# Patient Record
Sex: FEMALE | Race: WHITE | URBAN - METROPOLITAN AREA
[De-identification: names, ages, dates, MRNs, and addresses within clinical notes are randomized per-mention and may not be internally consistent; named-entity substitution may affect disease eponyms.]

---

## 2017-10-12 ENCOUNTER — HOSPITAL ENCOUNTER (OUTPATIENT)
Dept: PHYSICAL THERAPY | Age: 72
Discharge: HOME | End: 2017-10-31

## 2017-10-12 NOTE — FLOWSHEET NOTE
Physical Therapy Daily Treatment Note    Date:  10/12/2017    Patient Name:  Marlee Decker    :  1945  MRN: [id number]  Restrictions/Precautions:    Medical/Treatment Diagnosis Information:  · Diagnosis: Vascular insufficiency  Insurance/Certification information:  PT Insurance Information: Medicare [employer]  Physician Information:  Referring Practitioner: Hilma Krabbe  Plan of care signed (Y/N): Faxed on day of eval  Visit# / total visits:      G-Code (if applicable):         PT G-Codes  Functional Assessment Tool Used: LEFS  Score: Raw Score 19  Functional Limitation: Mobility: Walking and moving around  Mobility: Walking and Moving Around Current Status (): At least 60 percent but less than 80 percent impaired, limited or restricted  Mobility: Walking and Moving Around Goal Status (): At least 40 percent but less than 60 percent impaired, limited or restricted    Medicare Cap (if applicable):  $ 342.88 = total amount used, updated 10/12/2017    Time in:   1:30      Timed Treatment: 30 min Total Treatment Time: 90 minutes  ________________________________________________________________________________________    Pain Level:   0 /10 LLE, pt does report chronic LBP  SUBJECTIVE:      OBJECTIVE:     Exercise/Equipment Resistance/Repetitions Other comments            Start next visit                                                                                                                                 Other Therapeutic Activities:  Pt educated in lymphedema prevention/precautions, use of compression , types of compression, benefits of ex to promote lymph flow, use of jobst pump. Discussed goals of treatment and plan of care.  Pt voices understanding of all instructions and agreement with plan and goals    Manual Treatments: MLD to LLE x 25 minutes        Modalities:  Jobst pump x 15 minutes to LLE at 50mmHG    Test/Measurements:    LLE GIRTH*: 4\"AK 65 cm, Knee 49 cm,  4\"BK 45.5cm,

## 2017-10-12 NOTE — PROGRESS NOTES
Physical Therapy  Initial Assessment  Date: 10/12/2017  Patient Name: Leora Tran  MRN: [id number]  : 1945     Treatment Diagnosis: lymphedema LLE    Restrictions   none per script    Subjective   Chart Reviewed: Yes  Patient assessed for rehabilitation services?: Yes  Additional Pertinent Hx: DDD lumbar spine, OA, R THR-anterior, hypothyroid, CAD, CHRISTY, Obesity, HTN, DJD bilat knees. Pt denies CHF or CKD  Family / Caregiver Present: No  Referring Practitioner: Corine Palomo  Referral Date : 10/12/17  Diagnosis: Vascular insufficiency  Follows Commands: Within Functional Limits  PT Visit Information:   Onset Date: 17  PT Insurance Information: [employer]    Subjective: Pt reports was told she had lymhedema about a month ago. Tried compression hose from [employer] which were too tight. Now wearing circaids she ordered online which are knee high.   Pain Screening  Patient Currently in Pain: Denies (Denies LE pain, does have chronic LBP 4/10)    Vision/Hearing  Vision: Impaired (glasses)  Hearing: Within functional limits    Orientation  Overall Orientation Status: Within Normal Limits    Social/Functional History  Social/Functional History  Lives With: Spouse  Type of Home: House  Home Layout: One level  Home Access: Stairs to enter without rails (3)  ADL Assistance: Independent ( helps with compression garment and sometimes with shoes)  Ambulation Assistance: Independent  Transfer Assistance: Independent  Active : Yes  Occupation: Retired  Objective   Observation: Skin pink LLE compared to RLE, firm to touch in lower leg , skin dry at feet, unable to assess nail beds due to polish  LLE GIRTH*: 4\"AK 65cm, Knee 49cm, 4\"BK 45.5cm, 8\"BK 36cm, 12\"BK 25cm   TOTAL= 220.5cm  RLE GIRTH: 4\"AK 56cm, Knee 43cm, 4\"BK 41cm,  8\"BK 32cm, 12\"BK 23.5cm   TOTAL= 195.5cm    RLE AROM: WFL  LLE AROM : WFL    Strength RLE: WFL  Strength LLE: WFL    Assessment   Conditions Requiring Skilled Therapeutic Intervention  Assessment: Pt referred to OP PT for complex decongestive therapy LLE which is diagnosed with venous insufficiency. Pt has 25cm difference in girth LLE compared to RLE. Skin is pink LLE, firm to touch in LE, dry at ankle and foot. Pt has a circ aide type compression garment she ordered online which is knee high. Treatment Diagnosis: lymphedema LLE  Prognosis: Good  Decision Making: Low Complexity  Patient Education: role of PT, safety  Barriers to Learning: none  REQUIRES PT FOLLOW UP: Yes  Activity Tolerance: Patient Tolerated treatment well         Plan   Times per week: 2x week for up to 4 weeks  Specific instructions for Next Treatment: MLD, jobst pump , ther ex, education, compression garment  Current Treatment Recommendations: Safety Education & Training, Manual Lymphatic Drainage (jobst pump)    G-Code  PT G-Codes  Functional Assessment Tool Used: LEFS  Score: Raw Score 19  Functional Limitation: Mobility: Walking and moving around  Mobility: Walking and Moving Around Current Status (): At least 60 percent but less than 80 percent impaired, limited or restricted  Mobility: Walking and Moving Around Goal Status ():  At least 40 percent but less than 60 percent impaired, limited or restricted    Goals  Short term goals  Time Frame for Short term goals: 4 weeks  Short term goal 1: Pt to be indep with skin hygeine, lymphedema prevention/precautions  Short term goal 2: pt to be indep with HEP  Short term goal 3: pt to voice understanding of self massage and use of compression garment  Short term goal 4: decrease Total Girth LLE by at least 12 cm  Patient Goals   Patient goals : \"decrease swelling and increased mobiltiy LLE\"       Therapy Time   Individual Concurrent Group Co-treatment   Time In 0130         Time Out 0300         Minutes 90         Timed Code Treatment Minutes: 56 Kandice Dove, [id number]

## 2017-10-17 ENCOUNTER — HOSPITAL ENCOUNTER (OUTPATIENT)
Dept: PHYSICAL THERAPY | Age: 72
Discharge: HOME | End: 2017-10-17

## 2017-10-17 NOTE — FLOWSHEET NOTE
Physical Therapy Daily Treatment Note    Date:  10/17/2017    Patient Name:  Adriano Davis    :  1945  MRN: [id number]  Restrictions/Precautions:    Medical/Treatment Diagnosis Information:  · Diagnosis: Vascular insufficiency  Insurance/Certification information:  PT Insurance Information: Medicare [employer]  Physician Information:  Referring Practitioner: Justine Becerra  Plan of care signed (Y/N):  YES  Visit# / total visits: 2/8     G-Code (if applicable):         PT G-Codes  Functional Assessment Tool Used: LEFS  Score: Raw Score 19  Functional Limitation: Mobility: Walking and moving around  Mobility: Walking and Moving Around Current Status (): At least 60 percent but less than 80 percent impaired, limited or restricted  Mobility: Walking and Moving Around Goal Status (): At least 40 percent but less than 60 percent impaired, limited or restricted    Medicare Cap (if applicable):  $ 186.58 = total amount used, updated 10/17/2017    Time in:   1:00      Timed Treatment: 45 min Total Treatment Time: 60 minutes  ________________________________________________________________________________________    Pain Level:   0 /10 LLE, pt does report chronic LBP  SUBJECTIVE:  \"My leg felt so much better after the last session.  I looked at compression pumps on line and I'm thinking of getting one for home\"    OBJECTIVE:     Exercise/Equipment Resistance/Repetitions Other comments            Ankle pumps 10 x B    Ankle circles 10 x B    Heel slides 10 x B    Quad sets 10 x B    Hip IR/ER 10 x B    Hooklying march 10 x B    Glute sets 10 x B                                                       Other Therapeutic Activities:  Manual Treatments: MLD to LLE x 25 minutes        Modalities:  Jobst pump x 20 minutes to LLE at 50mmHG    Test/Measurements:    LLE GIRTH*: 4\"AK( 65) 63 cm, Knee( 49) 46 cm,  4\"BK (45.5) 43.5cm,  8\"BK(36) 33 cm,  12\"BK( 25) 24cm      TOTAL =(220.5) 209.5 cm  RLE GIRTH: 4\"AK 56 cm, Knee 43 cm,   4\"BK 41  cm,  8\"BK 32 cm, 12\"BK 23.5cm  TOTAL= 195.5cm  Total difference in girth R to L 25 cm on eval, 14 cm today. ASSESSMENT:   2nd visit today with 11 cm improvement in LLE total girth. Pt has 14 cm difference in girth LLE compared to RLE. Skin is pink LLE, firm to touch in LE, dry at ankle and foot. Pt has a circ aide type compression garment she ordered online which is knee high.       Treatment/Activity Tolerance:   [x] Patient tolerated treatment well [] Patient limited by fatique  [] Patient limited by pain [] Patient limited by other medical complications  [] Other:     Goals:      Short term goals  Time Frame for Short term goals: 4 weeks  Short term goal 1: Pt to be indep with skin hygeine, lymphedema prevention/precautions  Short term goal 2: pt to be indep with HEP  Short term goal 3: pt to voice understanding of self massage and use of compression garment  Short term goal 4: decrease Total Girth LLE by at least 12 cm  Patient Goals   Patient goals : \"decrease swelling and increased mobiltiy LLE\"          Plan: [x] Continue per plan of care [] Alter current plan (see comments)   [] Plan of care initiated [] Hold pending MD visit [] Discharge      Plan for Next Session:  ROMY, hawk ex, jobst compression pump, education    Re-Certification Due Date:   11/12/2017 or 10th visit      Signature:  Joshua Corley, [employer]

## 2017-10-31 ENCOUNTER — HOSPITAL ENCOUNTER (OUTPATIENT)
Dept: PHYSICAL THERAPY | Age: 72
Discharge: HOME | End: 2017-10-31

## 2017-11-01 ENCOUNTER — HOSPITAL ENCOUNTER (OUTPATIENT)
Dept: OTHER | Age: 72
Discharge: HOME | End: 2017-11-30

## 2017-11-02 NOTE — PROGRESS NOTES
Outpatient Physical Therapy  Phone: [phone] Fax: [phone]     To: Dr. Clint Mcintosh       From: Isaias Franco, PT     Patient: Jenny Purcell      : 1945  Diagnosis: LLE lymphedema      Date: 2017  Treatment Diagnosis:  LLE lymphedema    Physical Therapy Certification/Early Discharge  Dear Aliya Serrano  Patient has cancelled additional appointments. Was seen 3 x by PT and goals met. Pt requests continuing home program indep  Plan of Care/Treatment to date:  [] Therapeutic Exercise   [] Modalities:  [] Therapeutic Activity     [] Ultrasound  [] Electrical Stimulation   [] Gait Training      [] Cervical Traction [] Lumbar Traction  [] Neuromuscular Re-education  [] Hot/Coldpack [] Iontophoresis    [] Instruction in HEP      Other:  [] Manual Therapy       []                        [] Aquatic Therapy       []                      ? Frequency/Duration: Discharge today, pt happy with progress, initial goals met. Pt to cont with home program, has puchased jobst pump for [place]iana  # Days per week: [] 1 day # Weeks: [] 1 week [] 5 weeks      [] 2 days? [] 2 weeks [] 6 weeks     [] 3 days   [] 3 weeks [] 7 weeks     [] 4 days   [] 4 weeks [] 8 weeks    Rehab Potential: [] Excellent [] Good [] Fair  [] Poor       Electronically signed by:  Isaias Franco, [id number]      If you have any questions or concerns, please don't hesitate to call.   Thank you for your referral.    Physician Signature:________________________________Date:__________________  By signing above, therapists plan is approved by physician

## 2024-07-23 NOTE — PROGRESS NOTES
ENDOSCOPY PREOP INSTRUCTIONS      Please at arrival time given to you from your doctor's office.  Report to the MAIN entrance on [address] and register at the surgery center on the left-hand side of the lobby  You will need your insurance card and photo id and a list of all medications taken on a regular basis. Please include the dose/frequency.    For your procedure:     PLEASE FOLLOW ALL INSTRUCTIONS & PREPS GIVEN TO YOU BY YOUR DOCTOR'S OFFICE.    If you have not received these instructions yet, please call the office immediately. Make sure to read them as soon as received.   If you are taking blood thinners, Aspirin or diabetic medication, make sure to call your doctor as soon as possible for instructions prior to your procedure.  Please dress comfortably and do not wear any lotion, powders or jewelry  If you use oxygen at home, please bring your oxygen tank with you to hospital.  Arrange for someone to be with you and sign you out & drive you home after your procedure.  THIS PERSON MUST WAIT AT HOSPITAL THE ENTIRE TIME.  We allow 2 adult visitors with you in the hospital & masks are strongly recommended.    WOMEN ONLY OF CHILDBEARING AGE: Please make sure to be able to give a urine sample on arrival      If you have further questions, you may contact your Endoscopist's office or Pre Admission Testing staff at [phone]

## 2024-07-24 ENCOUNTER — ANESTHESIA (OUTPATIENT)
Dept: ENDOSCOPY | Age: 79
End: 2024-07-24
Payer: MEDICARE

## 2024-07-24 ENCOUNTER — ANESTHESIA EVENT (OUTPATIENT)
Dept: ENDOSCOPY | Age: 79
End: 2024-07-24
Payer: MEDICARE

## 2024-07-24 ENCOUNTER — HOSPITAL ENCOUNTER (OUTPATIENT)
Age: 79
Setting detail: OUTPATIENT SURGERY
Discharge: HOME | End: 2024-07-24
Attending: INTERNAL MEDICINE | Admitting: INTERNAL MEDICINE
Payer: MEDICARE

## 2024-07-24 VITALS
TEMPERATURE: 97.2 F | OXYGEN SATURATION: 100 % | DIASTOLIC BLOOD PRESSURE: 57 MMHG | HEART RATE: 75 BPM | SYSTOLIC BLOOD PRESSURE: 123 MMHG | HEIGHT: 67 IN | RESPIRATION RATE: 16 BRPM | BODY MASS INDEX: 37.51 KG/M2 | WEIGHT: 239 LBS

## 2024-07-24 PROCEDURE — 7100000010 HC PHASE II RECOVERY - FIRST 15 MIN: Performed by: INTERNAL MEDICINE

## 2024-07-24 PROCEDURE — 2580000003 HC RX 258: Performed by: ANESTHESIOLOGY

## 2024-07-24 PROCEDURE — 3700000000 HC ANESTHESIA ATTENDED CARE: Performed by: INTERNAL MEDICINE

## 2024-07-24 PROCEDURE — 2500000003 HC RX 250 WO HCPCS

## 2024-07-24 PROCEDURE — 2709999900 HC NON-CHARGEABLE SUPPLY: Performed by: INTERNAL MEDICINE

## 2024-07-24 PROCEDURE — 3700000001 HC ADD 15 MINUTES (ANESTHESIA): Performed by: INTERNAL MEDICINE

## 2024-07-24 PROCEDURE — 3609027000 HC COLONOSCOPY: Performed by: INTERNAL MEDICINE

## 2024-07-24 PROCEDURE — 7100000011 HC PHASE II RECOVERY - ADDTL 15 MIN: Performed by: INTERNAL MEDICINE

## 2024-07-24 PROCEDURE — 6360000002 HC RX W HCPCS

## 2024-07-24 RX ORDER — LEVOTHYROXINE SODIUM 0.2 MG/1
200 TABLET ORAL DAILY
COMMUNITY
Start: 2024-04-29

## 2024-07-24 RX ORDER — POTASSIUM CHLORIDE 750 MG/1
10 TABLET, EXTENDED RELEASE ORAL DAILY
COMMUNITY
Start: 2024-04-19

## 2024-07-24 RX ORDER — ELECTROLYTES/DEXTROSE
SOLUTION, ORAL ORAL
COMMUNITY
Start: 2018-09-10

## 2024-07-24 RX ORDER — BUPIVACAINE HYDROCHLORIDE 5 MG/ML
2 INJECTION, SOLUTION PERINEURAL
COMMUNITY
Start: 2022-01-04

## 2024-07-24 RX ORDER — NETARSUDIL AND LATANOPROST OPHTHALMIC SOLUTION, 0.02%/0.005% .2; .05 MG/ML; MG/ML
SOLUTION/ DROPS OPHTHALMIC; TOPICAL DAILY
COMMUNITY
Start: 2022-03-16

## 2024-07-24 RX ORDER — CETIRIZINE HYDROCHLORIDE 10 MG/1
10 TABLET ORAL DAILY
COMMUNITY
Start: 2022-01-10

## 2024-07-24 RX ORDER — SOTALOL HYDROCHLORIDE 80 MG/1
TABLET ORAL
COMMUNITY
Start: 2022-06-06

## 2024-07-24 RX ORDER — LIDOCAINE HYDROCHLORIDE 20 MG/ML
INJECTION, SOLUTION INFILTRATION; PERINEURAL PRN
Status: DISCONTINUED | OUTPATIENT
Start: 2024-07-24 | End: 2024-07-24 | Stop reason: SDUPTHER

## 2024-07-24 RX ORDER — CARVEDILOL 12.5 MG/1
12.5 TABLET ORAL 2 TIMES DAILY WITH MEALS
COMMUNITY
Start: 2024-04-03

## 2024-07-24 RX ORDER — PHENOL 1.4 %
1 AEROSOL, SPRAY (ML) MUCOUS MEMBRANE DAILY
COMMUNITY

## 2024-07-24 RX ORDER — PROPOFOL 10 MG/ML
INJECTION, EMULSION INTRAVENOUS CONTINUOUS PRN
Status: DISCONTINUED | OUTPATIENT
Start: 2024-07-24 | End: 2024-07-24 | Stop reason: SDUPTHER

## 2024-07-24 RX ORDER — PROPOFOL 10 MG/ML
INJECTION, EMULSION INTRAVENOUS PRN
Status: DISCONTINUED | OUTPATIENT
Start: 2024-07-24 | End: 2024-07-24 | Stop reason: SDUPTHER

## 2024-07-24 RX ORDER — VITS A,C,E/LUTEIN/MINERALS 300MCG-200
1 TABLET ORAL NIGHTLY
COMMUNITY

## 2024-07-24 RX ORDER — MELOXICAM 15 MG/1
15 TABLET ORAL EVERY MORNING
COMMUNITY
Start: 2023-12-18

## 2024-07-24 RX ORDER — GLYCOPYRROLATE 0.2 MG/ML
INJECTION INTRAMUSCULAR; INTRAVENOUS PRN
Status: DISCONTINUED | OUTPATIENT
Start: 2024-07-24 | End: 2024-07-24 | Stop reason: SDUPTHER

## 2024-07-24 RX ORDER — SODIUM CHLORIDE, SODIUM LACTATE, POTASSIUM CHLORIDE, CALCIUM CHLORIDE 600; 310; 30; 20 MG/100ML; MG/100ML; MG/100ML; MG/100ML
INJECTION, SOLUTION INTRAVENOUS CONTINUOUS
Status: DISCONTINUED | OUTPATIENT
Start: 2024-07-24 | End: 2024-07-24 | Stop reason: HOSPADM

## 2024-07-24 RX ORDER — UBIDECARENONE 100 MG
300 CAPSULE ORAL NIGHTLY
COMMUNITY

## 2024-07-24 RX ORDER — FAMOTIDINE 20 MG/1
20 TABLET, FILM COATED ORAL 2 TIMES DAILY
COMMUNITY
Start: 2023-12-08

## 2024-07-24 RX ORDER — EMPAGLIFLOZIN 10 MG/1
1 TABLET, FILM COATED ORAL DAILY
COMMUNITY
Start: 2023-03-22

## 2024-07-24 RX ORDER — SACUBITRIL AND VALSARTAN 97; 103 MG/1; MG/1
0.5 TABLET, FILM COATED ORAL 2 TIMES DAILY
COMMUNITY
Start: 2024-07-08

## 2024-07-24 RX ORDER — TORSEMIDE 20 MG/1
40 TABLET ORAL DAILY
COMMUNITY
Start: 2022-06-06

## 2024-07-24 RX ORDER — FLUTICASONE PROPIONATE 50 MCG
2 SPRAY, SUSPENSION (ML) NASAL PRN
COMMUNITY

## 2024-07-24 RX ORDER — ASPIRIN 81 MG/1
81 TABLET ORAL DAILY
COMMUNITY
Start: 2022-11-10

## 2024-07-24 RX ADMIN — GLYCOPYRROLATE 0.2 MG: 0.2 INJECTION INTRAMUSCULAR; INTRAVENOUS at 11:36

## 2024-07-24 RX ADMIN — LIDOCAINE HYDROCHLORIDE 100 MG: 20 INJECTION, SOLUTION INFILTRATION; PERINEURAL at 11:33

## 2024-07-24 RX ADMIN — PROPOFOL 60 MG: 10 INJECTION, EMULSION INTRAVENOUS at 11:36

## 2024-07-24 RX ADMIN — PROPOFOL 40 MG: 10 INJECTION, EMULSION INTRAVENOUS at 11:33

## 2024-07-24 RX ADMIN — PROPOFOL 150 MCG/KG/MIN: 10 INJECTION, EMULSION INTRAVENOUS at 11:33

## 2024-07-24 RX ADMIN — SODIUM CHLORIDE, POTASSIUM CHLORIDE, SODIUM LACTATE AND CALCIUM CHLORIDE: 600; 310; 30; 20 INJECTION, SOLUTION INTRAVENOUS at 10:39

## 2024-07-24 NOTE — PROGRESS NOTES
Ambulatory Surgery/Procedure Discharge Note    Vitals:    07/24/24 1206   BP: (!) 113/54   Pulse: 79   Resp: 16   Temp:    SpO2: 99%       Pt ready for discharge per Helen score    In: 600 [I.V.:600]  Out: -     Restroom use offered before discharge.  Yes    Pain assessment:  level of pain (1-10, 10 severe),   Pain Level: 0    Pt and S.O./family states \"ready to go home\". Pt alert and oriented x4. IV removed. Denies N/V or pain. Voided prior to discharge. Pt tolerating po intake. Discharge instructions given to pt and spouse with pt permission. Pt and spouse verbalized understanding of all instructions. Left with all belongings,  and discharge instructions.     Patient discharged to home/self care. Patient discharged via wheel chair by transporter to waiting family/S.O.       7/24/2024 12:11 PM

## 2024-07-24 NOTE — H&P
Gastroenterology Note             Pre-operative History and Physical    Patient: May Gil  : 1945  CSN: [id number]    History Obtained From:  patient and/or guardian.     HISTORY OF PRESENT ILLNESS:    The patient is a 78 y.o. female  here for colon cancer screening-personal history of colon polyps.      Past Medical History:    Past Medical History:   Diagnosis Date    A-fib (HCC)     History of cardioversion     Hyperlipidemia     Hypertension     Sleep apnea     uses cpap at night    Thyroid disease      Past Surgical History:    Past Surgical History:   Procedure Laterality Date    COLONOSCOPY      CORONARY ANGIOPLASTY WITH STENT PLACEMENT      stents x2    EYE SURGERY      JOINT REPLACEMENT      right knee, bilateral hips     Medications Prior to Admission:   No current facility-administered medications on file prior to encounter.     Current Outpatient Medications on File Prior to Encounter   Medication Sig Dispense Refill    famotidine (PEPCID) 20 MG tablet Take 1 tablet by mouth 2 times daily      levothyroxine (SYNTHROID) 200 MCG tablet Take 1 tablet by mouth daily      meloxicam (MOBIC) 15 MG tablet Take 1 tablet by mouth every morning      Multiple Vitamin (MULTIVITAMIN ADULT) TABS MULTIVITAMIN ADULT TABS      netarsudil-Latanoprost (ROCKLATAN) 0.02-0.005 % ophthalmic solution daily      potassium chloride (KLOR-CON M) 10 MEQ extended release tablet Take 1 tablet by mouth daily      ENTRESTO  MG per tablet Take 0.5 tablets by mouth 2 times daily      torsemide (DEMADEX) 20 MG tablet Take 2 tablets by mouth daily      sotalol (BETAPACE) 80 MG tablet       JARDIANCE 10 MG tablet Take 1 tablet by mouth daily      cetirizine (ZYRTEC) 10 MG tablet Take 1 tablet by mouth daily      carvedilol (COREG) 12.5 MG tablet Take 1 tablet by mouth 2 times daily (with meals)      BUPivacaine (MARCAINE) 0.5 % injection 2 mLs      aspirin 81 MG EC tablet Take 1 tablet by mouth daily      apixaban

## 2024-07-24 NOTE — ANESTHESIA PRE PROCEDURE
Department of Anesthesiology  Preprocedure Note       Name:  May Gil   Age:  78 y.o.  :  1945                                          MRN:  [id number]         Date:  2024      Surgeon: Surgeon(s):  Jeffy Palacios MD    Procedure: Procedure(s):  COLONOSCOPY    Medications prior to admission:   Prior to Admission medications    Not on File       Current medications:    No current facility-administered medications for this encounter.       Allergies:  Not on File    Problem List:  There is no problem list on file for this patient.      Past Medical History:  No past medical history on file.    Past Surgical History:  No past surgical history on file.    Social History:    Social History     Tobacco Use    Smoking status: Not on file    Smokeless tobacco: Not on file   Substance Use Topics    Alcohol use: Not on file                                Counseling given: Not Answered      Vital Signs (Current): There were no vitals filed for this visit.                                           BP Readings from Last 3 Encounters:   No data found for BP       NPO Status:                                                                                 BMI:   Wt Readings from Last 3 Encounters:   No data found for Wt     There is no height or weight on file to calculate BMI.    CBC: No results found for: \"WBC\", \"RBC\", \"HGB\", \"HCT\", \"MCV\", \"RDW\", \"PLT\"    CMP: No results found for: \"NA\", \"K\", \"CL\", \"CO2\", \"BUN\", \"CREATININE\", \"GFRAA\", \"AGRATIO\", \"LABGLOM\", \"GLUCOSE\", \"GLU\", \"CALCIUM\", \"BILITOT\", \"ALKPHOS\", \"AST\", \"ALT\"    POC Tests: No results for input(s): \"POCGLU\", \"POCNA\", \"POCK\", \"POCCL\", \"POCBUN\", \"POCHEMO\", \"POCHCT\" in the last 72 hours.    Coags: No results found for: \"PROTIME\", \"INR\", \"APTT\"    HCG (If Applicable): No results found for: \"PREGTESTUR\", \"PREGSERUM\", \"HCG\", \"HCGQUANT\"     ABGs: No results found for: \"PHART\", \"PO2ART\", \"VKC7CVD\", \"PVZ4GXS\", \"BEART\", \"K1XYPKBM\"     Type & Screen (If

## 2024-07-24 NOTE — DISCHARGE INSTRUCTIONS
Colonoscopy in 5 years for polyp surveillance especially given redundant colon.  Benefiber 1 tablespoonful orally once daily.    ENDOSCOPY DISCHARGE INSTRUCTIONS:    Call the physician that did your procedure for any questions or concern:    [facility]: [phone]  DR. JOVAN DICKINSON      ACTIVITY:    There are potential side effects to the medications used for sedation and anesthesia during your procedure.  These include:  Dizziness or light-headedness, confusion or memory loss, delayed reaction times, loss of coordination, nausea and vomiting.  Because of your increased risk for injury, we ask that you observe the following precautions:  For the next 24 hours,  DO NOT operate an automobile, bicycle, motorcycle, , power tools or large equipment of any kind.  Do not drink alcohol, sign any legal documents or make any legal decisions for 24 hours.  Do not bend your head over lower than your heart.  DO sit on the side of bed/couch awhile before getting up.  Plan on bedrest or quiet relaxation today.  You may resume normal activities in 24 hours.    DIET:    Your first meal today should be light, avoiding spicy and fatty foods.  If you tolerate this first meal, then you may advance to your regular diet unless otherwise advised by your physician.    NORMAL SYMPTOMS:  -Mild sore throat if you’ve had an EGD   -Gaseous discomfort    NOTIFY YOUR PHYSICIAN IF THESE SYMPTOMS OCCUR:  1. Fever (greater than 100)  5. Increased abdominal bloating  2. Severe pain    6. Excessive bleeding  3. Nausea and vomiting  7. Chest pain                                                                    4. Chills    8. Shortness of breath    ADDITIONAL INSTRUCTIONS:    Biopsy results: Call [facility] for biopsy results in 1 week         Learning About Colonoscopy  What is a colonoscopy?     A colonoscopy is a test (also called a procedure) that lets a doctor look inside your large intestine. The doctor uses a thin, lighted tube

## 2024-07-24 NOTE — PROCEDURES
Colonoscopy Procedure  Note          Patient: May Gil  : 1945  CRN:  @CRN@    Procedure: Colonoscopy    Date:  2024    Surgeon:  Jeffy Palacios MD, MD    Referring Physician:  Hao Ang DO    Preoperative Diagnosis:  History of colon polyps [Z86.010]  Colon cancer screening [Z12.11]    Postoperative Diagnosis:  * No post-op diagnosis entered *    Anesthesia:  MAC    EBL: Minimal to none.    Indications: This is a 78 y.o. year old female who presents today with previous adenomatous polyp  screening for colon cancer.      Procedure:   An informed consent was obtained from the patient after explanation of indications, benefits, possible risks and complications of the procedure.  The patient was then taken to the endoscopy suite, placed in the left lateral decubitus position, and the above IV anesthesia was administered.      Digital rectal examination was performed.  With the patient in the left lateral decubitus position the endoscope was inserted through the anorectal area into the rectum. The scope was then advanced through the length of the colon to the terminal ileum.  The quality of preparation was adequate.  The scope was carefully withdrawn and the mucosa was fully inspected including retroflexion in the rectum. Findings and interventions are described below.      The patient tolerated the procedure well and was taken to the PACU in good condition.  There were no immediate complications.      Impression:    Rectal examination revealed small to moderate size external and internal hemorrhoids that were not inflamed.  Terminal ileum was normal.  Examination of the colon mucosa was normal without evidence of polyps/masses/inflammation.  Moderate sigmoid diverticulosis was noted.  Colon was redundant.    Recommendations:    Colonoscopy in 5 years for polyp surveillance especially given redundant colon.  Benefiber 1 tablespoonful orally once daily.    Jeffy Palacios MD, FACG   Gastro

## 2024-07-24 NOTE — ANESTHESIA POSTPROCEDURE EVALUATION
Department of Anesthesiology  Postprocedure Note    Patient: May Gil  MRN: [id number]  YOB: 1945  Date of evaluation: 7/24/2024    Procedure Summary       Date: 07/24/24 Room / Location: [facility] ENDO 01 / [facility]    Anesthesia Start: 1130 Anesthesia Stop: 1155    Procedure: COLONOSCOPY Diagnosis:       History of colon polyps      Colon cancer screening      (History of colon polyps [Z86.010])      (Colon cancer screening [Z12.11])    Surgeons: Jeffy Palacios MD Responsible Provider: Misbah Mejias MD    Anesthesia Type: MAC ASA Status: 3            Anesthesia Type: No value filed.    Helen Phase I: Helen Score: 10    Helen Phase II: Helen Score: 10    Anesthesia Post Evaluation    Patient location during evaluation: PACU  Patient participation: complete - patient participated  Level of consciousness: awake  Pain score: 0  Airway patency: patent  Nausea & Vomiting: no nausea  Cardiovascular status: hemodynamically stable  Respiratory status: acceptable  Hydration status: stable  Pain management: adequate    No notable events documented.

## (undated) DEVICE — CANNULA SAMP CO2 AD GRN 7FT CO2 AND 7FT O2 TBNG UNIV CONN